# Patient Record
Sex: FEMALE | Race: WHITE | NOT HISPANIC OR LATINO | Employment: FULL TIME | URBAN - METROPOLITAN AREA
[De-identification: names, ages, dates, MRNs, and addresses within clinical notes are randomized per-mention and may not be internally consistent; named-entity substitution may affect disease eponyms.]

---

## 2023-06-13 ENCOUNTER — TELEPHONE (OUTPATIENT)
Dept: PRIMARY CARE | Facility: CLINIC | Age: 38
End: 2023-06-13
Payer: COMMERCIAL

## 2023-06-14 NOTE — TELEPHONE ENCOUNTER
Patient said she isgoing out of town and was trying to get 3 vaccines, HepA&b, yellow fever, & one more. Should she go to the travel clinic

## 2023-06-19 ENCOUNTER — CLINICAL SUPPORT (OUTPATIENT)
Dept: PRIMARY CARE | Facility: CLINIC | Age: 38
End: 2023-06-19
Payer: COMMERCIAL

## 2023-06-19 DIAGNOSIS — Z23 NEED FOR VACCINATION: ICD-10-CM

## 2023-06-19 PROCEDURE — 90651 9VHPV VACCINE 2/3 DOSE IM: CPT | Performed by: STUDENT IN AN ORGANIZED HEALTH CARE EDUCATION/TRAINING PROGRAM

## 2023-06-19 PROCEDURE — 90471 IMMUNIZATION ADMIN: CPT | Performed by: STUDENT IN AN ORGANIZED HEALTH CARE EDUCATION/TRAINING PROGRAM

## 2023-12-27 ENCOUNTER — TELEPHONE (OUTPATIENT)
Dept: PRIMARY CARE | Facility: CLINIC | Age: 38
End: 2023-12-27

## 2023-12-27 NOTE — TELEPHONE ENCOUNTER
Zuleika  Pls ask where/ what country is she traveling to  She will need VV to discuss the malaria and the oral typhoid

## 2023-12-27 NOTE — TELEPHONE ENCOUNTER
Patient will be traveling out of the country Friday Dec 29, 2023. Patient is requesting anti malaria and typhoid medication.

## 2024-08-02 ENCOUNTER — LAB (OUTPATIENT)
Dept: LAB | Facility: LAB | Age: 39
End: 2024-08-02
Payer: COMMERCIAL

## 2024-08-02 DIAGNOSIS — L66.9 CICATRICIAL ALOPECIA, UNSPECIFIED: ICD-10-CM

## 2024-08-02 DIAGNOSIS — F33.8 OTHER RECURRENT DEPRESSIVE DISORDERS (CMS-HCC): ICD-10-CM

## 2024-08-02 DIAGNOSIS — E55.9 VITAMIN D DEFICIENCY, UNSPECIFIED: ICD-10-CM

## 2024-08-02 DIAGNOSIS — Z00.00 ENCOUNTER FOR GENERAL ADULT MEDICAL EXAMINATION WITHOUT ABNORMAL FINDINGS: Primary | ICD-10-CM

## 2024-08-02 DIAGNOSIS — D55.9 ANEMIA DUE TO ENZYME DISORDER, UNSPECIFIED (CMS-HCC): ICD-10-CM

## 2024-08-02 LAB
25(OH)D3 SERPL-MCNC: 35 NG/ML (ref 30–100)
ALBUMIN SERPL BCP-MCNC: 4.2 G/DL (ref 3.4–5)
ALP SERPL-CCNC: 45 U/L (ref 33–110)
ALT SERPL W P-5'-P-CCNC: 23 U/L (ref 7–45)
ANION GAP SERPL CALC-SCNC: 12 MMOL/L (ref 10–20)
AST SERPL W P-5'-P-CCNC: 21 U/L (ref 9–39)
BASOPHILS # BLD AUTO: 0.04 X10*3/UL (ref 0–0.1)
BASOPHILS NFR BLD AUTO: 0.6 %
BILIRUB SERPL-MCNC: 0.4 MG/DL (ref 0–1.2)
BUN SERPL-MCNC: 13 MG/DL (ref 6–23)
CALCIUM SERPL-MCNC: 9.3 MG/DL (ref 8.6–10.6)
CHLORIDE SERPL-SCNC: 102 MMOL/L (ref 98–107)
CHOLEST SERPL-MCNC: 184 MG/DL (ref 0–199)
CHOLESTEROL/HDL RATIO: 3.4
CO2 SERPL-SCNC: 29 MMOL/L (ref 21–32)
CREAT SERPL-MCNC: 0.82 MG/DL (ref 0.5–1.05)
DHEA-S SERPL-MCNC: 98 UG/DL (ref 12–379)
EGFRCR SERPLBLD CKD-EPI 2021: >90 ML/MIN/1.73M*2
EOSINOPHIL # BLD AUTO: 0.06 X10*3/UL (ref 0–0.7)
EOSINOPHIL NFR BLD AUTO: 0.9 %
ERYTHROCYTE [DISTWIDTH] IN BLOOD BY AUTOMATED COUNT: 12.9 % (ref 11.5–14.5)
EST. AVERAGE GLUCOSE BLD GHB EST-MCNC: 103 MG/DL
ESTRADIOL SERPL-MCNC: 70 PG/ML
FERRITIN SERPL-MCNC: 77 NG/ML (ref 8–150)
FSH SERPL-ACNC: 6.8 IU/L
GLUCOSE SERPL-MCNC: 92 MG/DL (ref 74–99)
HBA1C MFR BLD: 5.2 %
HCT VFR BLD AUTO: 41.4 % (ref 36–46)
HDLC SERPL-MCNC: 54 MG/DL
HGB BLD-MCNC: 13.6 G/DL (ref 12–16)
IMM GRANULOCYTES # BLD AUTO: 0.02 X10*3/UL (ref 0–0.7)
IMM GRANULOCYTES NFR BLD AUTO: 0.3 % (ref 0–0.9)
IRON SATN MFR SERPL: 22 % (ref 25–45)
IRON SERPL-MCNC: 74 UG/DL (ref 35–150)
LDLC SERPL CALC-MCNC: 122 MG/DL
LH SERPL-ACNC: 8.4 IU/L
LYMPHOCYTES # BLD AUTO: 1.91 X10*3/UL (ref 1.2–4.8)
LYMPHOCYTES NFR BLD AUTO: 27.2 %
MCH RBC QN AUTO: 30.4 PG (ref 26–34)
MCHC RBC AUTO-ENTMCNC: 32.9 G/DL (ref 32–36)
MCV RBC AUTO: 92 FL (ref 80–100)
MONOCYTES # BLD AUTO: 0.48 X10*3/UL (ref 0.1–1)
MONOCYTES NFR BLD AUTO: 6.8 %
NEUTROPHILS # BLD AUTO: 4.5 X10*3/UL (ref 1.2–7.7)
NEUTROPHILS NFR BLD AUTO: 64.2 %
NON HDL CHOLESTEROL: 130 MG/DL (ref 0–149)
NRBC BLD-RTO: 0 /100 WBCS (ref 0–0)
PLATELET # BLD AUTO: 334 X10*3/UL (ref 150–450)
POTASSIUM SERPL-SCNC: 4.5 MMOL/L (ref 3.5–5.3)
PROLACTIN SERPL-MCNC: 6.3 UG/L (ref 3–20)
PROT SERPL-MCNC: 6.1 G/DL (ref 6.4–8.2)
RBC # BLD AUTO: 4.48 X10*6/UL (ref 4–5.2)
SODIUM SERPL-SCNC: 138 MMOL/L (ref 136–145)
T3FREE SERPL-MCNC: 3.1 PG/ML (ref 2.3–4.2)
T4 FREE SERPL-MCNC: 1.1 NG/DL (ref 0.78–1.48)
TIBC SERPL-MCNC: 335 UG/DL (ref 240–445)
TRIGL SERPL-MCNC: 41 MG/DL (ref 0–149)
TSH SERPL-ACNC: 1.61 MIU/L (ref 0.44–3.98)
UIBC SERPL-MCNC: 261 UG/DL (ref 110–370)
VIT B12 SERPL-MCNC: 361 PG/ML (ref 211–911)
VLDL: 8 MG/DL (ref 0–40)
WBC # BLD AUTO: 7 X10*3/UL (ref 4.4–11.3)

## 2024-08-02 PROCEDURE — 83036 HEMOGLOBIN GLYCOSYLATED A1C: CPT

## 2024-08-02 PROCEDURE — 84439 ASSAY OF FREE THYROXINE: CPT

## 2024-08-02 PROCEDURE — 85025 COMPLETE CBC W/AUTO DIFF WBC: CPT

## 2024-08-02 PROCEDURE — 80053 COMPREHEN METABOLIC PANEL: CPT

## 2024-08-02 PROCEDURE — 84402 ASSAY OF FREE TESTOSTERONE: CPT

## 2024-08-02 PROCEDURE — 83540 ASSAY OF IRON: CPT

## 2024-08-02 PROCEDURE — 83001 ASSAY OF GONADOTROPIN (FSH): CPT

## 2024-08-02 PROCEDURE — 84443 ASSAY THYROID STIM HORMONE: CPT

## 2024-08-02 PROCEDURE — 82306 VITAMIN D 25 HYDROXY: CPT

## 2024-08-02 PROCEDURE — 84146 ASSAY OF PROLACTIN: CPT

## 2024-08-02 PROCEDURE — 84481 FREE ASSAY (FT-3): CPT

## 2024-08-02 PROCEDURE — 83550 IRON BINDING TEST: CPT

## 2024-08-02 PROCEDURE — 36415 COLL VENOUS BLD VENIPUNCTURE: CPT

## 2024-08-02 PROCEDURE — 82607 VITAMIN B-12: CPT

## 2024-08-02 PROCEDURE — 82728 ASSAY OF FERRITIN: CPT

## 2024-08-02 PROCEDURE — 80061 LIPID PANEL: CPT

## 2024-08-02 PROCEDURE — 86038 ANTINUCLEAR ANTIBODIES: CPT

## 2024-08-02 PROCEDURE — 82627 DEHYDROEPIANDROSTERONE: CPT

## 2024-08-02 PROCEDURE — 82670 ASSAY OF TOTAL ESTRADIOL: CPT

## 2024-08-02 PROCEDURE — 83002 ASSAY OF GONADOTROPIN (LH): CPT

## 2024-08-06 LAB — ANA SER QL HEP2 SUBST: NEGATIVE

## 2024-08-11 LAB
TESTOSTERONE FREE (CHAN): 2.3 PG/ML (ref 0.1–6.4)
TESTOSTERONE,TOTAL,LC-MS/MS: 29 NG/DL (ref 2–45)

## 2024-09-20 ENCOUNTER — HOSPITAL ENCOUNTER (OUTPATIENT)
Dept: RADIOLOGY | Facility: CLINIC | Age: 39
Discharge: HOME | End: 2024-09-20
Payer: COMMERCIAL

## 2024-09-20 VITALS — WEIGHT: 145 LBS | HEIGHT: 68 IN | BODY MASS INDEX: 21.98 KG/M2

## 2024-09-20 DIAGNOSIS — Z12.31 SCREENING MAMMOGRAM FOR BREAST CANCER: ICD-10-CM

## 2024-09-20 PROCEDURE — 77067 SCR MAMMO BI INCL CAD: CPT

## 2024-10-28 ENCOUNTER — LAB (OUTPATIENT)
Dept: LAB | Facility: LAB | Age: 39
End: 2024-10-28
Payer: COMMERCIAL

## 2024-10-28 DIAGNOSIS — Z00.00 ENCOUNTER FOR GENERAL ADULT MEDICAL EXAMINATION WITHOUT ABNORMAL FINDINGS: ICD-10-CM

## 2024-10-28 DIAGNOSIS — E55.9 VITAMIN D DEFICIENCY, UNSPECIFIED: ICD-10-CM

## 2024-10-28 DIAGNOSIS — E03.8 SUBCLINICAL HYPOTHYROIDISM: ICD-10-CM

## 2024-10-28 DIAGNOSIS — F33.8 OTHER RECURRENT DEPRESSIVE DISORDERS: ICD-10-CM

## 2024-10-28 DIAGNOSIS — L66.9 CICATRICIAL ALOPECIA, UNSPECIFIED: ICD-10-CM

## 2024-10-28 DIAGNOSIS — D55.9 ANEMIA DUE TO ENZYME DISORDER, UNSPECIFIED (CMS-HCC): ICD-10-CM

## 2024-10-28 LAB
ERYTHROCYTE [DISTWIDTH] IN BLOOD BY AUTOMATED COUNT: 12.2 % (ref 11.5–14.5)
HCT VFR BLD AUTO: 40 % (ref 36–46)
HGB BLD-MCNC: 13.5 G/DL (ref 12–16)
MCH RBC QN AUTO: 30.4 PG (ref 26–34)
MCHC RBC AUTO-ENTMCNC: 33.8 G/DL (ref 32–36)
MCV RBC AUTO: 90 FL (ref 80–100)
NRBC BLD-RTO: 0 /100 WBCS (ref 0–0)
PLATELET # BLD AUTO: 326 X10*3/UL (ref 150–450)
RBC # BLD AUTO: 4.44 X10*6/UL (ref 4–5.2)
WBC # BLD AUTO: 6.7 X10*3/UL (ref 4.4–11.3)

## 2024-10-28 PROCEDURE — 82306 VITAMIN D 25 HYDROXY: CPT

## 2024-10-28 PROCEDURE — 85027 COMPLETE CBC AUTOMATED: CPT

## 2024-10-28 PROCEDURE — 84443 ASSAY THYROID STIM HORMONE: CPT

## 2024-10-28 PROCEDURE — 84439 ASSAY OF FREE THYROXINE: CPT

## 2024-10-28 PROCEDURE — 80053 COMPREHEN METABOLIC PANEL: CPT

## 2024-10-28 PROCEDURE — 36415 COLL VENOUS BLD VENIPUNCTURE: CPT

## 2024-10-29 LAB
25(OH)D3 SERPL-MCNC: 28 NG/ML (ref 30–100)
ALBUMIN SERPL BCP-MCNC: 4.5 G/DL (ref 3.4–5)
ALP SERPL-CCNC: 48 U/L (ref 33–110)
ALT SERPL W P-5'-P-CCNC: 15 U/L (ref 7–45)
ANION GAP SERPL CALC-SCNC: 12 MMOL/L (ref 10–20)
AST SERPL W P-5'-P-CCNC: 16 U/L (ref 9–39)
BILIRUB SERPL-MCNC: 0.5 MG/DL (ref 0–1.2)
BUN SERPL-MCNC: 18 MG/DL (ref 6–23)
CALCIUM SERPL-MCNC: 9.9 MG/DL (ref 8.6–10.6)
CHLORIDE SERPL-SCNC: 102 MMOL/L (ref 98–107)
CO2 SERPL-SCNC: 28 MMOL/L (ref 21–32)
CREAT SERPL-MCNC: 0.66 MG/DL (ref 0.5–1.05)
EGFRCR SERPLBLD CKD-EPI 2021: >90 ML/MIN/1.73M*2
GLUCOSE SERPL-MCNC: 64 MG/DL (ref 74–99)
POTASSIUM SERPL-SCNC: 5.3 MMOL/L (ref 3.5–5.3)
PROT SERPL-MCNC: 6.9 G/DL (ref 6.4–8.2)
SODIUM SERPL-SCNC: 137 MMOL/L (ref 136–145)
T4 FREE SERPL-MCNC: 0.73 NG/DL (ref 0.78–1.48)
TSH SERPL-ACNC: 0.79 MIU/L (ref 0.44–3.98)

## 2025-01-29 ENCOUNTER — APPOINTMENT (OUTPATIENT)
Dept: SLEEP MEDICINE | Facility: CLINIC | Age: 40
End: 2025-01-29
Payer: COMMERCIAL

## 2025-01-29 VITALS
OXYGEN SATURATION: 97 % | SYSTOLIC BLOOD PRESSURE: 110 MMHG | WEIGHT: 142 LBS | BODY MASS INDEX: 21.52 KG/M2 | DIASTOLIC BLOOD PRESSURE: 68 MMHG | HEIGHT: 68 IN | HEART RATE: 79 BPM

## 2025-01-29 DIAGNOSIS — G47.00 INSOMNIA, UNSPECIFIED TYPE: ICD-10-CM

## 2025-01-29 DIAGNOSIS — G25.81 RESTLESS LEG SYNDROME: ICD-10-CM

## 2025-01-29 DIAGNOSIS — E83.10 DISORDER OF IRON METABOLISM: ICD-10-CM

## 2025-01-29 DIAGNOSIS — G47.30 SLEEP-DISORDERED BREATHING: Primary | ICD-10-CM

## 2025-01-29 PROBLEM — G47.9 REPETITIVE INTRUSIONS OF SLEEP: Status: ACTIVE | Noted: 2025-01-29

## 2025-01-29 PROCEDURE — 3008F BODY MASS INDEX DOCD: CPT | Performed by: INTERNAL MEDICINE

## 2025-01-29 PROCEDURE — 1036F TOBACCO NON-USER: CPT | Performed by: INTERNAL MEDICINE

## 2025-01-29 PROCEDURE — 99204 OFFICE O/P NEW MOD 45 MIN: CPT | Performed by: INTERNAL MEDICINE

## 2025-01-29 RX ORDER — VORTIOXETINE 10 MG/1
10 TABLET, FILM COATED ORAL
COMMUNITY

## 2025-01-29 RX ORDER — DEXTROAMPHETAMINE SACCHARATE, AMPHETAMINE ASPARTATE, DEXTROAMPHETAMINE SULFATE AND AMPHETAMINE SULFATE 1.25; 1.25; 1.25; 1.25 MG/1; MG/1; MG/1; MG/1
TABLET ORAL
COMMUNITY
Start: 2021-12-01

## 2025-01-29 ASSESSMENT — SLEEP AND FATIGUE QUESTIONNAIRES
SLEEP_PROBLEM_INTERFERES_DAILY_ACTIVITIES: VERY MUCH NOTICEABLE
HOW LIKELY ARE YOU TO NOD OFF OR FALL ASLEEP WHEN YOU ARE A PASSENGER IN A CAR FOR AN HOUR WITHOUT A BREAK: WOULD NEVER DOZE
WORRIED_DISTRESSED_DUE_TO_SLEEP: VERY MUCH NOTICEABLE
ESS-CHAD TOTAL SCORE: 6
HOW LIKELY ARE YOU TO NOD OFF OR FALL ASLEEP WHILE WATCHING TV: HIGH CHANCE OF DOZING
HOW LIKELY ARE YOU TO NOD OFF OR FALL ASLEEP WHILE SITTING QUIETLY AFTER LUNCH WITHOUT ALCOHOL: WOULD NEVER DOZE
WAKING_TOO_EARLY: VERY SEVERE
DIFFICULTY_FALLING_ASLEEP: MILD
HOW LIKELY ARE YOU TO NOD OFF OR FALL ASLEEP WHILE SITTING AND READING: MODERATE CHANCE OF DOZING
HOW LIKELY ARE YOU TO NOD OFF OR FALL ASLEEP IN A CAR, WHILE STOPPED FOR A FEW MINUTES IN TRAFFIC: WOULD NEVER DOZE
HOW LIKELY ARE YOU TO NOD OFF OR FALL ASLEEP WHILE LYING DOWN TO REST IN THE AFTERNOON WHEN CIRCUMSTANCES PERMIT: SLIGHT CHANCE OF DOZING
SLEEP_PROBLEM_NOTICEABLE_TO_OTHERS: MUCH
HOW LIKELY ARE YOU TO NOD OFF OR FALL ASLEEP WHILE SITTING AND TALKING TO SOMEONE: WOULD NEVER DOZE
SITING INACTIVE IN A PUBLIC PLACE LIKE A CLASS ROOM OR A MOVIE THEATER: WOULD NEVER DOZE
DIFFICULTY_STAYING_ASLEEP: VERY SEVERE
SATISFACTION_WITH_CURRENT_SLEEP_PATTERN: VERY DISSATISFIED

## 2025-01-29 ASSESSMENT — PAIN SCALES - GENERAL: PAINLEVEL_OUTOF10: 0-NO PAIN

## 2025-01-29 NOTE — PROGRESS NOTES
"s     Patient: Nakita Nelson    [id number]  : 1985 -- AGE 40 y.o.    Provider: Abram Sarkar MD     Location [facility]   Service Date: 2025              [facility]  New Visit Note      Subjective     HISTORY OF PRESENT ILLNESS     The patient's referring provider is: Janet Mcnally*    HISTORY OF PRESENT ILLNESS   Nakita Nelson is a 40 y.o. female who presents to a [facility] for a sleep medicine evaluation with concerns of Insomnia, Unrefreshing Sleep, and Breathing Problem.     The patient  has a past medical history of Anxiety disorder, unspecified (03/15/2017), Personal history of other mental and behavioral disorders (03/15/2017), and Personal history of other mental and behavioral disorders (03/15/2017)..    PAST SLEEP HISTORY  Prior sleep study results: Has never had a sleep study performed    CURRENT HISTORY    On today's visit, the patient presents to discuss Insomnia, Unrefreshing Sleep, and Breathing Problem     Trouble staying asleep 2019. Triggered dealing with difficulty falling and staying asleep and she was prescribed trazodone. Diagnosed with depression  She was working many jobs across different time zones, with a high pressure job. This was ongoing for 11 years. She was having difficulty getting back to sleep and her daughter was 1 year old. Trazodone helped her get asleep, but never felt refreshed.  Still having difficulty sleeping.  her job changed, but she continued to have issues sleeping. She would like to get off trazodone. She reports that she has become dependent on trazodone and could not fall or stay asleep without trazodone   she was in between jobs and registered for CBT-I online course with sleep \"compression\" and sleep hygiene and she was able to wean off trazodone for 10 days and 6.5 - 7 hours successfully and her progress \"fell off a john\". Her spouse " "things that it was due to issues that triggered anxiety. So she \"had no choice\" to start trazodone. She previously was on trazodone 100 mg and now on 25 mg    Sleep schedule  on weekdays / work days:  Usual Bedtime 1030 PM  (drowsy, nodding off)  Falls asleep around  1045 PM (takes trazodone to stay asleep)  Wake time  630 PM      Sleep schedule  on weekends/non work days : will stay in bed for an hour and a half    Naps  No     Total sleep time average is 7 hours/day with medication    Preferred sleeping position: side lying  She reports that she is taking supplement gummies. She has been taking it since August  She reports that she takes a \"sleep stack\" takes, glycine, L-theanine, magnesium and trazodone    REVIEW OF SYSTEMS     REVIEW OF SYSTEMS  Review of Systems    Sleep-related ROS:  snoring, nocturnal awakenings, waking up sweaty/night sweats, and dry mouth in the morning    Sleep environment:  Sleeps in bed  Bed partner :  Yes   Pets in bed :   No   Bedroom temperature: BEDROOM TEMP: cool  Noise :   Yes ,  has a sound machine on his side of the bed, she prefers since  Issues with bed comfort :   Yes , believes that she needs a new bed  Blackout drapes  Phone out of room, no electronics prior to bedtime  Avoids looking at phone 1 hour prior to sleep  Not eating before bed  Sleep Initiation: no problems going to sleep  Problems going to sleep associated with: No problems going to sleep    Sleep Maintenance: wakes up about 1 times per night  Problems staying asleep associated with: Problems Staying Asleep: no clear reason  Reports that she would never go back to sleep, she would lay and reposition her legs and body. Use the bathroom do a quiet activity, read, paint, draw. Typically would take at least an hour to get back to sleep. She reports that she would never fully get back to sleep    RLS Screen:  - Sensations: Patient has unusual sensations in their extremities that cause an urge to move them , - " Frequency: at night when going to sleep , - Relief: Symptoms ARE/ARENOT: are relieved with movement , - Circadian: Symptoms ARE/ARENOT: are typically improved later in the night. , - Movement: Patient has been told that their legs kick or jerk during sleep    Sleep-related behaviors:   DENIES  dreams upon falling asleep  hypnagogic/hypnopompic hallucinations  sleep paralysis  symptoms of cataplexy  sleep walking  kicking, punching, or acting out dreams    Daytime Symptoms    Patient reports some daytime symptoms including: DAYTIME SYMPTOMS: reports late to work/school due to sleepiness irritability during the day difficulty with memory or concentration during the day extreme fatigue  Patient denies daytime symptoms including: Denies: feeling sleepy when driving    ESS 6   Insomnia Severity Index  1. Difficulty falling asleep: Mild  2. Difficulty staying asleep: Very Severe  3. Problems waking up too early: Very Severe  4. How SATISFIED/DISSATISFIED are you with your CURRENT sleep pattern?: Very Dissatisfied  5. How NOTICEABLE to others do you think your sleep problem is in terms of impairing the quality of your life?: Much  6. How WORRIED/DISTRESSED are you about your current sleep problem?: Very Much Noticeable  7. To what extent do you consider your sleep problem to INTERFERE with your daily functioning (e.g. daytime fatigue, mood, ability to function at work/daily chores, concentration, memory, mood, etc.) CURRENTLY?: Very Much Noticeable  JACEK TS: 0-7 = No clinically significant insomnia 8-14 = Subthreshold insomnia 15-21 = Clinical insomnia (moderate severity) 22-28 = Clinical insomnia (severe): 24     FOSQ 24      ALLERGIES AND MEDICATIONS     ALLERGIES  No Known Allergies    MEDICATIONS  Current Outpatient Medications   Medication Sig Dispense Refill    amphetamine-dextroamphetamine (AdderalL) 5 mg tablet Take by mouth.      magnesium, amino acid chelate, 133 mg tablet Take 1 tablet (133 mg) by mouth 2 times a  "day.      THEANINE ORAL Take by mouth.      Trintellix 10 mg tablet tablet Take 1 tablet (10 mg) by mouth once daily.       No current facility-administered medications for this visit.       PAST HISTORY     PAST MEDICAL HISTORY  She  has a past medical history of Anxiety disorder, unspecified (03/15/2017), Personal history of other mental and behavioral disorders (03/15/2017), and Personal history of other mental and behavioral disorders (03/15/2017).    PAST SURGICAL HISTORY:  Past Surgical History:   Procedure Laterality Date    OTHER SURGICAL HISTORY  03/21/2022    No history of surgery       FAMILY HISTORY  No family history on file.      SOCIAL HISTORY  She  reports that she has never smoked. She has never used smokeless tobacco. She reports that she does not currently use alcohol. She reports that she does not use drugs.    Patient SOCIAL HISTORY : denies nicotine use  denies alcohol  reports caffeine use  denies marijuana use      PHYSICAL EXAM   Objective   VITAL SIGNS: /68   Pulse 79   Ht 1.727 m (5' 8\")   Wt 64.4 kg (142 lb)   SpO2 97%   BMI 21.59 kg/m²      CURRENT WEIGHT:   Vitals:    01/29/25 0806   Weight: 64.4 kg (142 lb)      BMI: Body mass index is 21.59 kg/m².   PREVIOUS WEIGHTS:  Wt Readings from Last 3 Encounters:   01/29/25 64.4 kg (142 lb)   09/20/24 65.8 kg (145 lb)   01/19/23 63 kg (139 lb)       Physical Exam  PHYSICAL EXAM: GENERAL: alert pleasant and cooperative no acute distress  nasal mucosa , pink, erythematous, and slight white mucous discharge  MODIFIED SPARKS SCORE: II  MODIFIED MALLAMPATI SCORE: II (hard and soft palate, upper portion of tonsils anduvula visible)  UVULA: midline  TONSILLAR SCORE: 1+  TONGUE SCALLOPING: midline protrusion  PSYCH EXAM: alert,oriented, in NAD with a full range of affect, normal behavior and no psychotic features      RESULTS/DATA     Bicarbonate (mmol/L)   Date Value   10/28/2024 28   08/02/2024 29   11/08/2022 32     Iron (ug/dL) "   Date Value   08/02/2024 74     % Saturation (%)   Date Value   08/02/2024 22 (L)     TIBC (ug/dL)   Date Value   08/02/2024 335     Ferritin (ng/mL)   Date Value   08/02/2024 77           ASSESSMENT/PLAN     Ms. Nelson is a 40 y.o. female and  has a past medical history of Anxiety disorder, unspecified (03/15/2017), Personal history of other mental and behavioral disorders (03/15/2017), and Personal history of other mental and behavioral disorders (03/15/2017). She was referred to the [facility] for evaluation of Insomnia, Unrefreshing Sleep, and Breathing Problem       Problem List and Orders  Problem List Items Addressed This Visit             ICD-10-CM    Insomnia G47.00     She feels as though she is reliant on trazodone. She has undergone self guided CBT-I. She is currently dealing with sleep maintenance insomnia without trazodone, but has unfreshed feeling upon waking with trazodone. Need to evaluate for underlying cause of sleep disruption through in lab sleep testing. Potentially secondary to sleep apnea or PLM-D based on clinical history.  She continues to have negative associations about her ability to sleep without medication. May benefit from formal CBT-I with sleep psychologist in the future if we are not able to identify a cause for sleep disruption  Gave her a handout on stimulus control options upon waking         Sleep-disordered breathing - Primary G47.30     Snoring with nocturnal awakenings and sleep disruption suggestive of sleep apnea, needs in lab testing         Relevant Orders    In-Center Sleep Study (Sleep Provider Only)    Restless leg syndrome G25.81     On an oral iron supplement for 6 months, still symptomatic, recheck iron levels and reevaluate treatment plan. 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

## 2025-01-29 NOTE — ASSESSMENT & PLAN NOTE
On an oral iron supplement for 6 months, still symptomatic, recheck iron levels and reevaluate treatment plan. Possible RLS, but not clear textbook description

## 2025-01-29 NOTE — PATIENT INSTRUCTIONS
Call  the  Sleep Center ([phone]) to speak with a sleep testing center  to book your overnight sleep study procedure at one of our adult and pediatric-friendly sleep labs. Overnight sleep studies may be scheduled on a weekday or weekend.      We have child-life services on a case-by-case basis at the [facility] location. We also perform daytime testing for shift workers on a case by case basis.     For the study: Bring usual medications and nightly routine items for your sleep study.  You may wish to bring a snack and nightly routine items you feel would be important to help you sleep (e.g. favorite pillow). Bring your sleep logs/requested paperwork to your sleep study appointment.     Results of your sleep study will be given to the ordering clinician. Please contact their office for results or followup as directed by your clinician. For additional information about the sleep medicine services, please call [phone].     Locations for sleep studies are [facility] ([facility]), [facility], [place], [facility], and [place].      Retraining Your Brain  To associate your bed with sleep  Reasoning: After a prolonged period of sleep difficulties, many people start to associate the bed not with sleep, but with wakefulness, frustration & anxiety. There are techniques to help strengthening the association between bed and sleep, while weakening the association between bed and lying awake/trying to sleep. This is the most thoroughly researched behavioral treatment for insomnia & has decades of research support.   If you have trouble staying awake until your scheduled bedtime, try some of these activities:  Work on a project or hobby that you find interesting  Read a book that is hard to put down  Watch a mystery or comedy show  Write a long letter  Call a friend  Do some stretching exercises  Stand up and walk around  Do some chores  Listen to upbeat  music  Turn on all the lights  Go outside  Prepare a meal for tomorrow or for the rest of the week  Sort laundry  Organize computer files  Organize drawers & shelves  Activities for the middle of the night, so you don't lie awake in bed:  Look through catalogues  Update your address book or start a new one  Sort junk mail & bills (but don't start paying them)  Play solitaire with cards   Catch up on your reading (as long as it's not too stimulating)  Make a grocery list for the coming week  Create a detailed menu for dinners  De-clutter your coffee table, dining room table, kitchen countertops or desk  Create a list of activities that you'd enjoy doing on weekends & vacations  Work on a photo album or scrapbook  Fold & put away clothes  Shop for holiday, wedding or birthday gifts in catalogs  Read magazines or other light material  Make a materials list for a project around the house  Watch infomercials, [employer], or the weather channel  Organize collections of CDs or DVDs & choose some to donate or sell   Give yourself a set amount of worry time & jot down anxious thoughts your mind is giving you on a notepad  Knit or do other crafts you can stop once you feel sleepy  Read your kids' books (they are often very comforting & positive)  When you have a hard time getting out of bed at the same time each day - have a few go-to activities to start as soon as you hear the alarm.  Such as:  Meditate or pray  Watch the sunrise  Take yourself or the dog for a walk  Read the news (online or the newspaper)  Workout (at home or at the gym)  Go to a store that opens early (like the grocery store)  Make your lunch for the day  Enjoy getting ready for the day without rushing  Sort out things for a yard sale or to donate  Start a budget   Check your email  Pay some bills  Start preparation for dinner (marinating, chopping veggies)  Make the bed & tidy up your bedroom  Open the curtains/blinds throughout the house & turn on the  lights  Sweep your sidewalk/steps or shovel snow  Do some light gardening or water your plants  Review you to-do list for the day/week

## 2025-01-29 NOTE — ASSESSMENT & PLAN NOTE
She feels as though she is reliant on trazodone. She has undergone self guided CBT-I. She is currently dealing with sleep maintenance insomnia without trazodone, but has unfreshed feeling upon waking with trazodone. Need to evaluate for underlying cause of sleep disruption through in lab sleep testing. Potentially secondary to sleep apnea or PLM-D based on clinical history.  She continues to have negative associations about her ability to sleep without medication. May benefit from formal CBT-I with sleep psychologist in the future if we are not able to identify a cause for sleep disruption  Gave her a handout on stimulus control options upon waking

## 2025-01-29 NOTE — ASSESSMENT & PLAN NOTE
Snoring with nocturnal awakenings and sleep disruption suggestive of sleep apnea, needs in lab testing

## 2025-02-25 ENCOUNTER — CLINICAL SUPPORT (OUTPATIENT)
Dept: SLEEP MEDICINE | Facility: HOSPITAL | Age: 40
End: 2025-02-25
Payer: COMMERCIAL

## 2025-02-25 DIAGNOSIS — G47.30 SLEEP-DISORDERED BREATHING: ICD-10-CM

## 2025-02-25 DIAGNOSIS — G47.9 SLEEP DISORDER, UNSPECIFIED: ICD-10-CM

## 2025-02-25 DIAGNOSIS — F51.01 PRIMARY INSOMNIA: ICD-10-CM

## 2025-02-25 PROCEDURE — 95810 POLYSOM 6/> YRS 4/> PARAM: CPT | Performed by: INTERNAL MEDICINE

## 2025-02-25 ASSESSMENT — SLEEP AND FATIGUE QUESTIONNAIRES
HOW LIKELY ARE YOU TO NOD OFF OR FALL ASLEEP WHILE LYING DOWN TO REST IN THE AFTERNOON WHEN CIRCUMSTANCES PERMIT: HIGH CHANCE OF DOZING
HOW LIKELY ARE YOU TO NOD OFF OR FALL ASLEEP WHEN YOU ARE A PASSENGER IN A CAR FOR AN HOUR WITHOUT A BREAK: SLIGHT CHANCE OF DOZING
HOW LIKELY ARE YOU TO NOD OFF OR FALL ASLEEP WHILE SITTING AND TALKING TO SOMEONE: WOULD NEVER DOZE
HOW LIKELY ARE YOU TO NOD OFF OR FALL ASLEEP WHILE WATCHING TV: HIGH CHANCE OF DOZING
HOW LIKELY ARE YOU TO NOD OFF OR FALL ASLEEP IN A CAR, WHILE STOPPED FOR A FEW MINUTES IN TRAFFIC: WOULD NEVER DOZE
HOW LIKELY ARE YOU TO NOD OFF OR FALL ASLEEP WHILE SITTING QUIETLY AFTER LUNCH WITHOUT ALCOHOL: WOULD NEVER DOZE
SITING INACTIVE IN A PUBLIC PLACE LIKE A CLASS ROOM OR A MOVIE THEATER: SLIGHT CHANCE OF DOZING
HOW LIKELY ARE YOU TO NOD OFF OR FALL ASLEEP WHILE SITTING AND READING: MODERATE CHANCE OF DOZING
ESS-CHAD TOTAL SCORE: 10

## 2025-02-26 VITALS
HEART RATE: 55 BPM | BODY MASS INDEX: 22.95 KG/M2 | HEIGHT: 68 IN | RESPIRATION RATE: 16 BRPM | OXYGEN SATURATION: 99 % | WEIGHT: 151.46 LBS | DIASTOLIC BLOOD PRESSURE: 93 MMHG | SYSTOLIC BLOOD PRESSURE: 127 MMHG

## 2025-02-26 NOTE — PROGRESS NOTES
[facility] TECH NOTE:     Patient: Nakita Nelson   MRN//AGE: [id number]  1985  40 y.o.   Technologist: MIREYA Caballero   Room: 2   Service Date: 2025        Sleep Testing Location: [facility]:     TECHNOLOGIST SLEEP STUDY PROCEDURE NOTE:   This sleep study is being conducted according to the policies and procedures outlined by the AAS accreditation standards.  The sleep study procedure and processes involved during this appointment was explained to the patient/patient’s family, questions were answered. The patient/family verbalized understanding.      The patient is a 40 y.o. year old female scheduled for a Diagnostic PSG Split night . She arrived for her appointment.      The study that was ultimately completed was a Diagnostic PSG Split night .    The full study was completed.  Patient questionnaires completed?: yes     Consents signed? yes    Initial Fall Risk Screening:     Nakita has not fallen in the last 6 months. Nakita does not have a fear of falling. She does not  need assistance with sitting, standing, or walking. She does not need assistance walking in her home. She  does not need assistance in an unfamiliar setting. The patient is not using an assistive device.     Brief Study observations: Patient was referred for a Diagnostic PSG. Patient reached sleep onset quickly.  All stages of sleep were achieved. Patient was seen in left, right and supine position.       After the procedure, the patient/family was informed to ensure followup with ordering clinician for testing results.      Technologist: MIREYA Caballero

## 2025-02-28 LAB
FERRITIN SERPL-MCNC: 91 NG/ML (ref 16–232)
IRON SATN MFR SERPL: 47 % (CALC) (ref 16–45)
IRON SERPL-MCNC: 137 MCG/DL (ref 40–190)
TIBC SERPL-MCNC: 292 MCG/DL (CALC) (ref 250–450)

## 2025-03-03 ENCOUNTER — APPOINTMENT (OUTPATIENT)
Dept: SLEEP MEDICINE | Facility: CLINIC | Age: 40
End: 2025-03-03
Payer: COMMERCIAL

## 2025-03-03 VITALS
OXYGEN SATURATION: 97 % | HEART RATE: 85 BPM | WEIGHT: 143 LBS | HEIGHT: 68 IN | DIASTOLIC BLOOD PRESSURE: 70 MMHG | SYSTOLIC BLOOD PRESSURE: 116 MMHG | BODY MASS INDEX: 21.67 KG/M2

## 2025-03-03 DIAGNOSIS — F51.04 PSYCHOPHYSIOLOGICAL INSOMNIA: Primary | ICD-10-CM

## 2025-03-03 PROCEDURE — 3008F BODY MASS INDEX DOCD: CPT | Performed by: INTERNAL MEDICINE

## 2025-03-03 PROCEDURE — 1036F TOBACCO NON-USER: CPT | Performed by: INTERNAL MEDICINE

## 2025-03-03 PROCEDURE — 99215 OFFICE O/P EST HI 40 MIN: CPT | Performed by: INTERNAL MEDICINE

## 2025-03-03 ASSESSMENT — SLEEP AND FATIGUE QUESTIONNAIRES
ESS-CHAD TOTAL SCORE: 10
HOW LIKELY ARE YOU TO NOD OFF OR FALL ASLEEP WHILE SITTING AND TALKING TO SOMEONE: WOULD NEVER DOZE
SITING INACTIVE IN A PUBLIC PLACE LIKE A CLASS ROOM OR A MOVIE THEATER: WOULD NEVER DOZE
HOW LIKELY ARE YOU TO NOD OFF OR FALL ASLEEP WHILE LYING DOWN TO REST IN THE AFTERNOON WHEN CIRCUMSTANCES PERMIT: SLIGHT CHANCE OF DOZING
HOW LIKELY ARE YOU TO NOD OFF OR FALL ASLEEP WHILE WATCHING TV: HIGH CHANCE OF DOZING
HOW LIKELY ARE YOU TO NOD OFF OR FALL ASLEEP WHEN YOU ARE A PASSENGER IN A CAR FOR AN HOUR WITHOUT A BREAK: SLIGHT CHANCE OF DOZING
HOW LIKELY ARE YOU TO NOD OFF OR FALL ASLEEP WHILE SITTING AND READING: HIGH CHANCE OF DOZING
HOW LIKELY ARE YOU TO NOD OFF OR FALL ASLEEP WHILE SITTING QUIETLY AFTER LUNCH WITHOUT ALCOHOL: MODERATE CHANCE OF DOZING
HOW LIKELY ARE YOU TO NOD OFF OR FALL ASLEEP IN A CAR, WHILE STOPPED FOR A FEW MINUTES IN TRAFFIC: WOULD NEVER DOZE

## 2025-03-03 ASSESSMENT — PAIN SCALES - GENERAL: PAINLEVEL_OUTOF10: 0-NO PAIN

## 2025-03-03 NOTE — PATIENT INSTRUCTIONS
You have been recommended to Cognitive Behavioral Therapy for Insomnia (CBT-I). CBT-I is widely recognized as an effective treatment for a wide range of insomnias. The treatment is typically made up of a number of components including assessment, behavioral and cognitive interventions, motivational techniques and relapse prevention skills. An overwhelming amount of evidence suggests that CBT-I is as effective as hypnotics and the newer non-benzodiazepine sleep aides in the acute treatment and is more effective than medication in the long term treatment of insomnia.     There are several approaches to CBTi:    See a Behavioral Sleep Medicine specialist for one-on-one counseling    CBT-I at  - Limited availability -   CBT-I at the [facility] - Dr. Aden Winters PsyD or Dr. Emily Orozco, PhD - Phone: [phone]  Fax: [phone]. There may be a several month waiting period.  CBT-I at [place] - Angelica Streeter PsyD (Call [phone] and speak with Betty Tracey  Fax: [phone] or backup fax: [phone])  Dr. Cydney Paredes - [email or web address]  - She only does telemedicine. She was formerly part of  but is in private practice and would be out-of-network  Dr. Hernandez - one on one CBT-I service www.[employer]. You may have to pay out of pocket and submit visits to your insurance for reimbursement.  Other BSM providers in [place]:  https://www.behavioralsleep.org/index.php/directory/browse-by/state?value=[place]      Do CBTi using an online platform:    There are several online platforms that are good resources, but may vary based on cost. These include:    Blaise- online course via [facility]. Self-guided. One time charge to sign up: Blaise ($40)  The SleepReset - online guided cognitive behavioral therapy https://[email or web address]/pricing ($300 for 8 weeks)  Sleepio - https://www.sleepio.com/sleepio/welcomeusint/354#1/1 (Some insurances or employers may cover - check with your HR  Benefits office)  SleepEZ- Free self-guided course from the [facility] https://www.veterantraining.[place].gov/insomnia/

## 2025-03-03 NOTE — PROGRESS NOTES
"     Patient: Nakita Nelson    [id number]  : 1985 -- AGE 40 y.o.    Provider: Abram Sarkar MD     Location [facility]   Service Date: 3/3/2025              [facility]  Followup Visit Note  Subjective   Patient ID: Nakita Nelson is a 40 y.o. female who presents for Review Sleep Study Results.  HPI    Prior Sleep History:  2025: Office Visit: Dr. Abram Sarkar: On today's visit, the patient presents to discuss Insomnia, Unrefreshing Sleep, and Breathing Problem     Trouble staying asleep 2019. Triggered dealing with difficulty falling and staying asleep and she was prescribed trazodone. Diagnosed with depression  She was working many jobs across different time zones, with a high pressure job. This was ongoing for 11 years. She was having difficulty getting back to sleep and her daughter was 1 year old. Trazodone helped her get asleep, but never felt refreshed.  Still having difficulty sleeping.  her job changed, but she continued to have issues sleeping. She would like to get off trazodone. She reports that she has become dependent on trazodone and could not fall or stay asleep without trazodone   she was in between jobs and registered for CBT-I online course with sleep \"compression\" and sleep hygiene and she was able to wean off trazodone for 10 days and 6.5 - 7 hours successfully and her progress \"fell off a john\". Her spouse things that it was due to issues that triggered anxiety. So she \"had no choice\" to start trazodone. She previously was on trazodone 100 mg and now on 25 mg     Sleep schedule  on weekdays / work days:  Usual Bedtime 1030 PM  (drowsy, nodding off)  Falls asleep around  1045 PM (takes trazodone to stay asleep)  Wake time  630 PM       Sleep schedule  on weekends/non work days : will stay in bed for an hour and a half     Naps  No      Total sleep time average is 7 hours/day with medication     Preferred " "sleeping position: side lying  She reports that she is taking supplement gummies. She has been taking it since August  She reports that she takes a \"sleep stack\" takes, glycine, L-theanine, magnesium and trazodone     REVIEW OF SYSTEMS     REVIEW OF SYSTEMS  Review of Systems     Sleep-related ROS:  snoring, nocturnal awakenings, waking up sweaty/night sweats, and dry mouth in the morning     Sleep environment:  Sleeps in bed  Bed partner :  Yes   Pets in bed :   No   Bedroom temperature: BEDROOM TEMP: cool  Noise :   Yes ,  has a sound machine on his side of the bed, she prefers since  Issues with bed comfort :   Yes , believes that she needs a new bed  Blackout drapes  Phone out of room, no electronics prior to bedtime  Avoids looking at phone 1 hour prior to sleep  Not eating before bed  Sleep Initiation: no problems going to sleep  Problems going to sleep associated with: No problems going to sleep     Sleep Maintenance: wakes up about 1 times per night  Problems staying asleep associated with: Problems Staying Asleep: no clear reason  Reports that she would never go back to sleep, she would lay and reposition her legs and body. Use the bathroom do a quiet activity, read, paint, draw. Typically would take at least an hour to get back to sleep. She reports that she would never fully get back to sleep     RLS Screen:  - Sensations: Patient has unusual sensations in their extremities that cause an urge to move them , - Frequency: at night when going to sleep , - Relief: Symptoms ARE/ARENOT: are relieved with movement , - Circadian: Symptoms ARE/ARENOT: are typically improved later in the night. , - Movement: Patient has been told that their legs kick or jerk during sleep     Sleep-related behaviors:   DENIES  dreams upon falling asleep  hypnagogic/hypnopompic hallucinations  sleep paralysis  symptoms of cataplexy  sleep walking  kicking, punching, or acting out dreams     Daytime Symptoms     Patient reports " some daytime symptoms including: DAYTIME SYMPTOMS: reports late to work/school due to sleepiness irritability during the day difficulty with memory or concentration during the day extreme fatigue  Patient denies daytime symptoms including: Denies: feeling sleepy when driving     ESS 6   Insomnia Severity Index  1. Difficulty falling asleep: Mild  2. Difficulty staying asleep: Very Severe  3. Problems waking up too early: Very Severe  4. How SATISFIED/DISSATISFIED are you with your CURRENT sleep pattern?: Very Dissatisfied  5. How NOTICEABLE to others do you think your sleep problem is in terms of impairing the quality of your life?: Much  6. How WORRIED/DISTRESSED are you about your current sleep problem?: Very Much Noticeable  7. To what extent do you consider your sleep problem to INTERFERE with your daily functioning (e.g. daytime fatigue, mood, ability to function at work/daily chores, concentration, memory, mood, etc.) CURRENTLY?: Very Much Noticeable  JACEK TS: 0-7 = No clinically significant insomnia 8-14 = Subthreshold insomnia 15-21 = Clinical insomnia (moderate severity) 22-28 = Clinical insomnia (severe): 24      FOSQ 24  Insomnia G47.00         She feels as though she is reliant on trazodone. She has undergone self guided CBT-I. She is currently dealing with sleep maintenance insomnia without trazodone, but has unfreshed feeling upon waking with trazodone. Need to evaluate for underlying cause of sleep disruption through in lab sleep testing. Potentially secondary to sleep apnea or PLM-D based on clinical history.  She continues to have negative associations about her ability to sleep without medication. May benefit from formal CBT-I with sleep psychologist in the future if we are not able to identify a cause for sleep disruption  Gave her a handout on stimulus control options upon waking         Sleep-disordered breathing - Primary G47.30        Snoring with nocturnal awakenings and sleep disruption  "suggestive of sleep apnea, needs in lab testing         Relevant Orders    In-Center Sleep Study (Sleep Provider Only)    Restless leg syndrome G25.81        On an oral iron supplement for 6 months, still symptomatic, recheck iron levels and reevaluate treatment plan. 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  2/25/2025: Diagnostic polysomnography: AHI 3% 1.5, AHI 4% 0.6, ARISTEO 0.1, PLMI 0.0.  Patient had normal sleep architecture with early sleep and decreased REM latency with normal WASO.  Sleep efficiency was supranormal and sleep architecture was overall normal except reduced slow-wave sleep which is likely secondary to underscoring.  Commonly seen asleep state misperception versus psychophysiological insomnia  ESS: 10   She reports more difficulty falling asleep than usual and she had a difficult time falling back asleep. Feels like she eventually fell asleep and was moving around more than \" I ordinarily do\". She felt cannula. She reports that she is very sensitive.   She took trazodone 50 mg when she woke up in the middle of the night    Review of Systems  Review of systems negative except as per HPI  Objective   /70   Pulse 85   Ht 1.727 m (5' 8\")   Wt 64.9 kg (143 lb)   SpO2 97%   BMI 21.74 kg/m²    PREVIOUS WEIGHTS:  Wt Readings from Last 3 Encounters:   03/03/25 64.9 kg (143 lb)   02/25/25 68.7 kg (151 lb 7.3 oz)   01/29/25 64.4 kg (142 lb)     Physical Exam  PHYSICAL EXAM: GENERAL: alert pleasant and cooperative no acute distress  PSYCH EXAM: alert,oriented, in NAD with a full range of affect, normal behavior and no psychotic features    Lab Results   Component Value Date    IRON 137 02/28/2025    TIBC 292 02/28/2025    FERRITIN 91 02/28/2025        Assessment/Plan   Problem List Items Addressed This " Visit             ICD-10-CM    Insomnia - Primary G47.00     We reviewed her sleep study in detail and I explained the results. Reassurance provided that her sleep study and sleep architecture are normal. She does appear to have some sleep state misperception and psychophysiolgical insomnia (negative associations of sleep. I did reassure her that this is very treatable and recommended Formal CBT-I.  Stimulus control. Goal to titrate her off her trazodone in the future. Negative sleep study. Sleep diaries and follow-up in 2-4 weeks

## 2025-03-03 NOTE — ASSESSMENT & PLAN NOTE
We reviewed her sleep study in detail and I explained the results. Reassurance provided that her sleep study and sleep architecture are normal. She does appear to have some sleep state misperception and psychophysiolgical insomnia (negative associations of sleep. I did reassure her that this is very treatable and recommended Formal CBT-I.  Stimulus control. Goal to titrate her off her trazodone in the future. Negative sleep study. Sleep diaries and follow-up in 2-4 weeks

## 2025-03-25 ENCOUNTER — APPOINTMENT (OUTPATIENT)
Dept: SLEEP MEDICINE | Facility: CLINIC | Age: 40
End: 2025-03-25
Payer: COMMERCIAL

## 2025-03-25 DIAGNOSIS — F51.04 PSYCHOPHYSIOLOGICAL INSOMNIA: Primary | ICD-10-CM

## 2025-03-25 PROCEDURE — 99214 OFFICE O/P EST MOD 30 MIN: CPT | Performed by: INTERNAL MEDICINE

## 2025-03-25 PROCEDURE — 1036F TOBACCO NON-USER: CPT | Performed by: INTERNAL MEDICINE

## 2025-03-25 ASSESSMENT — SLEEP AND FATIGUE QUESTIONNAIRES
HOW LIKELY ARE YOU TO NOD OFF OR FALL ASLEEP WHILE LYING DOWN TO REST IN THE AFTERNOON WHEN CIRCUMSTANCES PERMIT: MODERATE CHANCE OF DOZING
HOW LIKELY ARE YOU TO NOD OFF OR FALL ASLEEP WHILE SITTING AND READING: MODERATE CHANCE OF DOZING
HOW LIKELY ARE YOU TO NOD OFF OR FALL ASLEEP WHILE WATCHING TV: MODERATE CHANCE OF DOZING
HOW LIKELY ARE YOU TO NOD OFF OR FALL ASLEEP WHILE SITTING QUIETLY AFTER LUNCH WITHOUT ALCOHOL: WOULD NEVER DOZE
HOW LIKELY ARE YOU TO NOD OFF OR FALL ASLEEP IN A CAR, WHILE STOPPED FOR A FEW MINUTES IN TRAFFIC: WOULD NEVER DOZE
HOW LIKELY ARE YOU TO NOD OFF OR FALL ASLEEP WHEN YOU ARE A PASSENGER IN A CAR FOR AN HOUR WITHOUT A BREAK: SLIGHT CHANCE OF DOZING
HOW LIKELY ARE YOU TO NOD OFF OR FALL ASLEEP WHILE SITTING AND TALKING TO SOMEONE: WOULD NEVER DOZE
SITING INACTIVE IN A PUBLIC PLACE LIKE A CLASS ROOM OR A MOVIE THEATER: WOULD NEVER DOZE
ESS-CHAD TOTAL SCORE: 7

## 2025-03-25 NOTE — PROGRESS NOTES
Patient: Nakita Nelson    [id number]  : 1985 -- AGE 40 y.o.    Provider: Abram Sarkar MD     Location [facility]   Service Date: 3/25/2025              [facility]  Followup Visit Note      Virtual or Telephone Consent  An interactive audio and video telecommunication system which permits real time communications between the patient (at the originating site) and provider (at the distant site) was utilized to provide this telehealth service.   Verbal consent was requested and obtained from Nakita Nelson on this date, 25 for a telehealth visit and the patient's location was confirmed at the time of the visit.      HISTORY OF PRESENT ILLNESS     The patient's referring provider is: No ref. provider found    HISTORY OF PRESENT ILLNESS   Nakita Nelson is a 40 y.o. female who presents to a [facility] for followup.     The patient  has a past medical history of Anxiety disorder, unspecified (03/15/2017), Insomnia (), Personal history of other mental and behavioral disorders (03/15/2017), Personal history of other mental and behavioral disorders (03/15/2017), and Snoring..    PAST SLEEP HISTORY  2025: Office Visit: Dr. Abram Sarkar: On today's visit, the patient presents to discuss Insomnia, Unrefreshing Sleep, and Breathing Problem     Trouble staying asleep 2019. Triggered dealing with difficulty falling and staying asleep and she was prescribed trazodone. Diagnosed with depression  She was working many jobs across different time zones, with a high pressure job. This was ongoing for 11 years. She was having difficulty getting back to sleep and her daughter was 1 year old. Trazodone helped her get asleep, but never felt refreshed.  Still having difficulty sleeping.  her job changed, but she continued to have issues sleeping. She would like to get off trazodone. She reports that she has  "become dependent on trazodone and could not fall or stay asleep without trazodone  2024 she was in between jobs and registered for CBT-I online course with sleep \"compression\" and sleep hygiene and she was able to wean off trazodone for 10 days and 6.5 - 7 hours successfully and her progress \"fell off a john\". Her spouse things that it was due to issues that triggered anxiety. So she \"had no choice\" to start trazodone. She previously was on trazodone 100 mg and now on 25 mg     Sleep schedule  on weekdays / work days:  Usual Bedtime 1030 PM  (drowsy, nodding off)  Falls asleep around  1045 PM (takes trazodone to stay asleep)  Wake time  630 PM       Sleep schedule  on weekends/non work days : will stay in bed for an hour and a half     Naps  No      Total sleep time average is 7 hours/day with medication     Preferred sleeping position: side lying  She reports that she is taking supplement gummies. She has been taking it since August  She reports that she takes a \"sleep stack\" takes, glycine, L-theanine, magnesium and trazodone     REVIEW OF SYSTEMS     REVIEW OF SYSTEMS  Review of Systems     Sleep-related ROS:  snoring, nocturnal awakenings, waking up sweaty/night sweats, and dry mouth in the morning     Sleep environment:  Sleeps in bed  Bed partner :  Yes   Pets in bed :   No   Bedroom temperature: BEDROOM TEMP: cool  Noise :   Yes ,  has a sound machine on his side of the bed, she prefers since  Issues with bed comfort :   Yes , believes that she needs a new bed  Blackout drapes  Phone out of room, no electronics prior to bedtime  Avoids looking at phone 1 hour prior to sleep  Not eating before bed  Sleep Initiation: no problems going to sleep  Problems going to sleep associated with: No problems going to sleep     Sleep Maintenance: wakes up about 1 times per night  Problems staying asleep associated with: Problems Staying Asleep: no clear reason  Reports that she would never go back to sleep, she " would lay and reposition her legs and body. Use the bathroom do a quiet activity, read, paint, draw. Typically would take at least an hour to get back to sleep. She reports that she would never fully get back to sleep     RLS Screen:  - Sensations: Patient has unusual sensations in their extremities that cause an urge to move them , - Frequency: at night when going to sleep , - Relief: Symptoms ARE/ARENOT: are relieved with movement , - Circadian: Symptoms ARE/ARENOT: are typically improved later in the night. , - Movement: Patient has been told that their legs kick or jerk during sleep     Sleep-related behaviors:   DENIES  dreams upon falling asleep  hypnagogic/hypnopompic hallucinations  sleep paralysis  symptoms of cataplexy  sleep walking  kicking, punching, or acting out dreams     Daytime Symptoms     Patient reports some daytime symptoms including: DAYTIME SYMPTOMS: reports late to work/school due to sleepiness irritability during the day difficulty with memory or concentration during the day extreme fatigue  Patient denies daytime symptoms including: Denies: feeling sleepy when driving     ESS 6   Insomnia Severity Index  1. Difficulty falling asleep: Mild  2. Difficulty staying asleep: Very Severe  3. Problems waking up too early: Very Severe  4. How SATISFIED/DISSATISFIED are you with your CURRENT sleep pattern?: Very Dissatisfied  5. How NOTICEABLE to others do you think your sleep problem is in terms of impairing the quality of your life?: Much  6. How WORRIED/DISTRESSED are you about your current sleep problem?: Very Much Noticeable  7. To what extent do you consider your sleep problem to INTERFERE with your daily functioning (e.g. daytime fatigue, mood, ability to function at work/daily chores, concentration, memory, mood, etc.) CURRENTLY?: Very Much Noticeable  JACEK TS: 0-7 = No clinically significant insomnia 8-14 = Subthreshold insomnia 15-21 = Clinical insomnia (moderate severity) 22-28 = Clinical  "insomnia (severe): 24      FOSQ 24  Insomnia G47.00         She feels as though she is reliant on trazodone. She has undergone self guided CBT-I. She is currently dealing with sleep maintenance insomnia without trazodone, but has unfreshed feeling upon waking with trazodone. Need to evaluate for underlying cause of sleep disruption through in lab sleep testing. Potentially secondary to sleep apnea or PLM-D based on clinical history.  She continues to have negative associations about her ability to sleep without medication. May benefit from formal CBT-I with sleep psychologist in the future if we are not able to identify a cause for sleep disruption  Gave her a handout on stimulus control options upon waking  Sleep-disordered breathing - Primary G47.30.   Snoring with nocturnal awakenings and sleep disruption suggestive of sleep apnea, needs in lab testing          Relevant Orders    In-Center Sleep Study (Sleep Provider Only)    Restless leg syndrome G25.81        On an oral iron supplement for 6 months, still symptomatic, recheck iron levels and reevaluate treatment plan. 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 Abram Sarkar:Nakita presents for review of sleep study and discuss management.  2/25/2025: Diagnostic polysomnography: AHI 3% 1.5, AHI 4% 0.6, ARISTEO 0.1, PLMI 0.0.  Patient had normal sleep architecture with early sleep and decreased REM latency with normal WASO.  Sleep efficiency was supranormal and sleep architecture was overall normal except reduced slow-wave sleep which is likely secondary to underscoring.  Commonly seen asleep state misperception versus psychophysiological insomnia  ESS: 10   She reports more difficulty falling asleep than usual and she had a difficult time falling back asleep. Feels like she eventually fell asleep and was moving around more than \" I " "ordinarily do\". She felt cannula. She reports that she is very sensitive.   She took trazodone 50 mg when she woke up in the middle of the night.     Insomnia - Primary G47.00         We reviewed her sleep study in detail and I explained the results. Reassurance provided that her sleep study and sleep architecture are normal. She does appear to have some sleep state misperception and psychophysiolgical insomnia (negative associations of sleep. I did reassure her that this is very treatable and recommended Formal CBT-I.  Stimulus control. Goal to titrate her off her trazodone in the future. Negative sleep study. Sleep diaries and follow-up in 2-4 weeks     CURRENT HISTORY    On today's visit, the patient reports Insomnia   She is starting her new job and starts to think about things when waking up in the middle of the night. She is difficulty   She feels grumpy, ill, nausea when she had 5 hours of sleep    Sleep Schedule:   She gets in bed at 9:30 PM - 10 PM, will read in bed  Tries to sleep at 10:30 PM being drowsy  her sleep latency is 30 minutes  She has 1 awakenings per night that last for  minutes  When unable to sleep she will try to get back to sleep and able to sleep.  She waits an hour until she tries to get out of bed  Activities when unable to sleep upon waking include: she will paint with low lighting and an art desk. She is too tired to read in the middle of the night. She will listen to meditation which is helpful and progressive muscle relaxation. Sometimes it does not help and waits to get back to bed. Sometimes she will take a trazodone to try to fall back asleep. She had taken it in months and feels a hangover effect  She wakes by  6:30 AM - 7 AM   She gets about 5.5 - 8 hours of sleep per night.  She feels best when she gets at least 7 hours of sleep    Starting new job, so stressed and affecting ability to maintain sleep    Activities prior to bed:  Stops using electronics at: 8-9 PM  Taking " sleep medication (trazodone) and she did the sleep reset and she has weaned off the trazodone and has used it as needed. Used it 4 times out of 14 days  Last caffeinated beverage at 9-10 AM  Drinks alcohol prior to bedtime is rare.    Is not using blue light blockers starting    Activities upon waking  Is getting out of bed upon waking, struggles to get up and it is worse, feels like bricks laying on my body and will lay there for 30 minutes  Is using an SAD light for 20-30 minutes upon waking  Is not eating breakfast  Is exercising    RLS Followup:   SPPRLSFUSX: none.      Daytime Symptoms    Patient denies daytime symptoms including: Denies: excessive daytime sleepiness late to work/school due to sleepiness    Naps:   No. Naps     ESS: 7     REVIEW OF SYSTEMS     REVIEW OF SYSTEMS  Review of Systems      ALLERGIES AND MEDICATIONS     ALLERGIES  No Known Allergies    MEDICATIONS: She has a current medication list which includes the following prescription(s): amphetamine-dextroamphetamine - Take by mouth, magnesium (amino acid chelate) - Take 1 tablet (133 mg) by mouth 2 times a day, theanine - Take by mouth, and trintellix - Take 1 tablet (10 mg) by mouth once daily.    PAST MEDICAL HISTORY : She  has a past medical history of Anxiety disorder, unspecified (03/15/2017), Insomnia (2019), Personal history of other mental and behavioral disorders (03/15/2017), Personal history of other mental and behavioral disorders (03/15/2017), and Snoring.    PAST SURGICAL HISTORY: She  has a past surgical history that includes Other surgical history (03/21/2022).     FAMILY HISTORY: No changes since previous visit. Otherwise non-contributory as charted.       SOCIAL HISTORY  She  reports that she has never smoked. She has never used smokeless tobacco. She reports that she does not currently use alcohol. She reports that she does not use drugs.       PHYSICAL EXAM     VITAL SIGNS: There were no vitals taken for this visit.      CURRENT WEIGHT: [unfilled]  BMI: [unfilled]  PREVIOUS WEIGHTS:  Wt Readings from Last 3 Encounters:   03/03/25 64.9 kg (143 lb)   02/25/25 68.7 kg (151 lb 7.3 oz)   01/29/25 64.4 kg (142 lb)       Physical Exam  PHYSICAL EXAM: GENERAL: alert pleasant and cooperative no acute distress  PSYCH EXAM: alert,oriented, in NAD with a full range of affect, normal behavior and no psychotic features      RESULTS/DATA     IRON, TOTAL (mcg/dL)   Date Value   02/28/2025 137     Iron (ug/dL)   Date Value   08/02/2024 74     % SATURATION (% (calc))   Date Value   02/28/2025 47 (H)     % Saturation (%)   Date Value   08/02/2024 22 (L)     IRON BINDING CAPACITY (mcg/dL (calc))   Date Value   02/28/2025 292     TIBC (ug/dL)   Date Value   08/02/2024 335     FERRITIN (ng/mL)   Date Value   02/28/2025 91     Ferritin (ng/mL)   Date Value   08/02/2024 77       ASSESSMENT/PLAN     Ms. Nelson is a 40 y.o. female and  has a past medical history of Anxiety disorder, unspecified (03/15/2017), Insomnia (2019), Personal history of other mental and behavioral disorders (03/15/2017), Personal history of other mental and behavioral disorders (03/15/2017), and Snoring. She returns in followup to the [facility] for their Insomnia   .    Problem List and Orders  Problem List Items Addressed This Visit             ICD-10-CM    Insomnia - Primary G47.00     She has done a great job following CBT-I principles. She is able to fall asleep and not need trazodone to get to bed, but there are some nights she has difficulty falling back asleep upon waking. Will lay in bed for about 1 hour to try to get to sleep. I recommended she decrease this to 30 minutes before getting out of bed. Using SAD light in AM helpful, reduced electronic use. We did discuss consideration of Zaleplon (Sonata) for rare occasions when she has difficulty getting back to sleep, but I do not find it necessary at this time. She takes trazodone at these  times, but has hangover effect         Relevant Orders    Follow Up In Adult Sleep Medicine

## 2025-03-25 NOTE — ASSESSMENT & PLAN NOTE
She has done a great job following CBT-I principles. She is able to fall asleep and not need trazodone to get to bed, but there are some nights she has difficulty falling back asleep upon waking. Will lay in bed for about 1 hour to try to get to sleep. I recommended she decrease this to 30 minutes before getting out of bed. Using SAD light in AM helpful, reduced electronic use. We did discuss consideration of Zaleplon (Sonata) for rare occasions when she has difficulty getting back to sleep, but I do not find it necessary at this time. She takes trazodone at these times, but has hangover effect

## 2025-03-31 ENCOUNTER — APPOINTMENT (OUTPATIENT)
Dept: SLEEP MEDICINE | Facility: CLINIC | Age: 40
End: 2025-03-31
Payer: COMMERCIAL

## 2025-05-06 ENCOUNTER — APPOINTMENT (OUTPATIENT)
Dept: SLEEP MEDICINE | Facility: CLINIC | Age: 40
End: 2025-05-06
Payer: COMMERCIAL

## 2025-06-04 ENCOUNTER — TELEPHONE (OUTPATIENT)
Dept: SLEEP MEDICINE | Facility: CLINIC | Age: 40
End: 2025-06-04
Payer: COMMERCIAL

## 2025-08-25 ENCOUNTER — APPOINTMENT (OUTPATIENT)
Dept: SLEEP MEDICINE | Facility: CLINIC | Age: 40
End: 2025-08-25
Payer: COMMERCIAL

## 2025-09-26 ENCOUNTER — APPOINTMENT (OUTPATIENT)
Dept: RADIOLOGY | Facility: CLINIC | Age: 40
End: 2025-09-26
Payer: COMMERCIAL